# Patient Record
Sex: FEMALE | Race: WHITE | NOT HISPANIC OR LATINO | URBAN - METROPOLITAN AREA
[De-identification: names, ages, dates, MRNs, and addresses within clinical notes are randomized per-mention and may not be internally consistent; named-entity substitution may affect disease eponyms.]

---

## 2022-01-24 ENCOUNTER — OFFICE VISIT (OUTPATIENT)
Dept: URBAN - METROPOLITAN AREA CLINIC 118 | Facility: CLINIC | Age: 57
End: 2022-01-24

## 2022-03-16 ENCOUNTER — LAB OUTSIDE AN ENCOUNTER (OUTPATIENT)
Dept: URBAN - METROPOLITAN AREA CLINIC 118 | Facility: CLINIC | Age: 57
End: 2022-03-16

## 2022-03-16 ENCOUNTER — OFFICE VISIT (OUTPATIENT)
Dept: URBAN - METROPOLITAN AREA CLINIC 118 | Facility: CLINIC | Age: 57
End: 2022-03-16
Payer: COMMERCIAL

## 2022-03-16 VITALS
WEIGHT: 271 LBS | BODY MASS INDEX: 43.55 KG/M2 | DIASTOLIC BLOOD PRESSURE: 80 MMHG | HEART RATE: 60 BPM | SYSTOLIC BLOOD PRESSURE: 149 MMHG | TEMPERATURE: 97.7 F | HEIGHT: 66 IN

## 2022-03-16 DIAGNOSIS — E66.01 MORBID OBESITY DUE TO EXCESS CALORIES: ICD-10-CM

## 2022-03-16 DIAGNOSIS — R76.8 HEPATITIS B SURFACE ANTIGEN POSITIVE: ICD-10-CM

## 2022-03-16 DIAGNOSIS — Z12.11 SCREEN FOR COLON CANCER: ICD-10-CM

## 2022-03-16 PROBLEM — 238136002: Status: ACTIVE | Noted: 2022-03-16

## 2022-03-16 PROCEDURE — 99204 OFFICE O/P NEW MOD 45 MIN: CPT | Performed by: INTERNAL MEDICINE

## 2022-03-16 RX ORDER — DULOXETINE 30 MG/1
1 CAPSULE CAPSULE, DELAYED RELEASE PELLETS ORAL ONCE A DAY
Status: ACTIVE | COMMUNITY

## 2022-03-16 RX ORDER — AMLODIPINE BESYLATE 5 MG/1
1 TABLET TABLET ORAL ONCE A DAY
Status: ACTIVE | COMMUNITY

## 2022-03-16 RX ORDER — DICLOFENAC POTASSIUM 50 MG/1
1 TABLET WITH FOOD OR MILK AS NEEDED TABLET, FILM COATED ORAL TWICE A DAY
Status: ACTIVE | COMMUNITY

## 2022-03-16 NOTE — HPI-TODAY'S VISIT:
recently diagnosed with HbsAg pos 9/2021 and repeat pos 10/2021 reviewed outside labs, including AST/ALT normal HCV Ab neg HBV surf ag pos x 2  would donate plasma x 3, after 3rrd time told hbv pos  but was rejected 9/2021 as tested hbv pos no risk factors during that time frame +non professional tattoo a couple years ago  reports all household contacts were tested and neg

## 2022-03-18 LAB
A/G RATIO: 1.7
ALBUMIN: 4.2
ALKALINE PHOSPHATASE: 84
ALT (SGPT): 11
AST (SGOT): 15
BASO (ABSOLUTE): 0
BASOS: 0
BILIRUBIN, TOTAL: 0.3
BUN/CREATININE RATIO: 14
BUN: 12
CALCIUM: 9
CARBON DIOXIDE, TOTAL: 24
CHLORIDE: 101
CREATININE: 0.87
EGFR: 79
EOS (ABSOLUTE): 0.1
EOS: 1
GLOBULIN, TOTAL: 2.5
GLUCOSE: 88
HBSAG SCREEN: POSITIVE
HBV IU/ML: 300
HEMATOCRIT: 42.1
HEMATOLOGY COMMENTS:: (no result)
HEMOGLOBIN: 13.4
HEP A AB, TOTAL: NEGATIVE
HEP BE AB: POSITIVE
HEP BE AG: NEGATIVE
HEPATITIS B SURF AB QUANT: <3.1
HIV SCREEN 4TH GENERATION WRFX: NON REACTIVE
IMMATURE CELLS: (no result)
IMMATURE GRANS (ABS): 0
IMMATURE GRANULOCYTES: 0
INR: 0.9
LOG10 HBV IU/ML: 2.48
LYMPHS (ABSOLUTE): 2.1
LYMPHS: 31
Lab: (no result)
MCH: 26.4
MCHC: 31.8
MCV: 83
MONOCYTES(ABSOLUTE): 0.6
MONOCYTES: 9
NEUTROPHILS (ABSOLUTE): 3.8
NEUTROPHILS: 59
NRBC: (no result)
PLATELETS: 273
POTASSIUM: 4.4
PROTEIN, TOTAL: 6.7
PROTHROMBIN TIME: 10
RBC: 5.07
RDW: 14.3
SODIUM: 142
TEST INFORMATION:: (no result)
WBC: 6.7

## 2022-04-01 ENCOUNTER — TELEPHONE ENCOUNTER (OUTPATIENT)
Dept: URBAN - METROPOLITAN AREA CLINIC 23 | Facility: CLINIC | Age: 57
End: 2022-04-01

## 2022-08-31 ENCOUNTER — OFFICE VISIT (OUTPATIENT)
Dept: URBAN - METROPOLITAN AREA CLINIC 118 | Facility: CLINIC | Age: 57
End: 2022-08-31

## 2024-01-03 ENCOUNTER — OFFICE VISIT (OUTPATIENT)
Dept: URBAN - METROPOLITAN AREA CLINIC 118 | Facility: CLINIC | Age: 59
End: 2024-01-03
Payer: COMMERCIAL

## 2024-01-03 ENCOUNTER — DASHBOARD ENCOUNTERS (OUTPATIENT)
Age: 59
End: 2024-01-03

## 2024-01-03 VITALS
BODY MASS INDEX: 42.14 KG/M2 | HEIGHT: 66 IN | SYSTOLIC BLOOD PRESSURE: 133 MMHG | HEART RATE: 62 BPM | WEIGHT: 262.2 LBS | DIASTOLIC BLOOD PRESSURE: 78 MMHG | TEMPERATURE: 97.3 F

## 2024-01-03 DIAGNOSIS — E66.01 MORBID OBESITY DUE TO EXCESS CALORIES: ICD-10-CM

## 2024-01-03 DIAGNOSIS — R76.8 HEPATITIS B SURFACE ANTIGEN POSITIVE: ICD-10-CM

## 2024-01-03 DIAGNOSIS — Z12.11 SCREEN FOR COLON CANCER: ICD-10-CM

## 2024-01-03 PROCEDURE — 99214 OFFICE O/P EST MOD 30 MIN: CPT | Performed by: INTERNAL MEDICINE

## 2024-01-03 RX ORDER — DESVENLAFAXINE 50 MG/1
TABLET, FILM COATED, EXTENDED RELEASE ORAL
Qty: 90 TABLET | Status: ACTIVE | COMMUNITY

## 2024-01-03 RX ORDER — AMLODIPINE BESYLATE 5 MG/1
1 TABLET TABLET ORAL ONCE A DAY
Status: ACTIVE | COMMUNITY

## 2024-01-03 RX ORDER — LISINOPRIL 10 MG/1
TABLET ORAL
Qty: 30 TABLET | Status: ACTIVE | COMMUNITY

## 2024-01-03 RX ORDER — GABAPENTIN 100 MG/1
TAKE 1 CAPSULE BY MOUTH ONCE DAILY FOR 90 DAYS CAPSULE ORAL
Qty: 90 EACH | Refills: 0 | Status: ACTIVE | COMMUNITY

## 2024-01-03 RX ORDER — DULOXETINE 30 MG/1
1 CAPSULE CAPSULE, DELAYED RELEASE PELLETS ORAL ONCE A DAY
Status: ACTIVE | COMMUNITY

## 2024-01-03 RX ORDER — DICLOFENAC POTASSIUM 50 MG/1
1 TABLET WITH FOOD OR MILK AS NEEDED TABLET, FILM COATED ORAL TWICE A DAY
Status: ACTIVE | COMMUNITY

## 2024-01-03 RX ORDER — DICYCLOMINE HYDROCHLORIDE 10 MG/1
1 TABLET CAPSULE ORAL THREE TIMES A DAY
Qty: 90 TABLET | Refills: 1 | OUTPATIENT
Start: 2024-01-03 | End: 2024-03-03

## 2024-01-03 NOTE — HPI-TODAY'S VISIT:
Last seen 3/2022 labs as below  she reports was having diarrhea and constipation alternating does have some good days as well  also reports referred by Dr. Cobos prior to surgery for prolapsing  3/2022  recently diagnosed with HbsAg pos 9/2021 and repeat pos 10/2021 reviewed outside labs, including AST/ALT normal HCV Ab neg HBV surf ag pos x 2  would donate plasma x 3, after 3rrd time told hbv pos  but was rejected 9/2021 as tested hbv pos no risk factors during that time frame +non professional tattoo a couple years ago  reports all household contacts were tested and neg

## 2024-01-05 LAB
A/G RATIO: 1.4
ALBUMIN: 4.2
ALKALINE PHOSPHATASE: 78
ALT (SGPT): 10
AST (SGOT): 12
BILIRUBIN, TOTAL: 0.3
BUN/CREATININE RATIO: (no result)
BUN: 17
CALCIUM: 9.4
CARBON DIOXIDE, TOTAL: 31
CHLORIDE: 101
CREATININE: 1.01
EGFR: 65
GLOBULIN, TOTAL: 2.9
GLUCOSE: 102
HBV LOG10: 1.76
HEPATITIS B VIRUS DNA: 58
POTASSIUM: 4.2
PROTEIN, TOTAL: 7.1
SODIUM: 141

## 2024-01-25 ENCOUNTER — CLAIMS CREATED FROM THE CLAIM WINDOW (OUTPATIENT)
Dept: URBAN - METROPOLITAN AREA CLINIC 4 | Facility: CLINIC | Age: 59
End: 2024-01-25
Payer: COMMERCIAL

## 2024-01-25 ENCOUNTER — OFFICE VISIT (OUTPATIENT)
Dept: URBAN - METROPOLITAN AREA SURGERY CENTER 23 | Facility: SURGERY CENTER | Age: 59
End: 2024-01-25
Payer: COMMERCIAL

## 2024-01-25 DIAGNOSIS — Z12.11 COLON CANCER SCREENING: ICD-10-CM

## 2024-01-25 DIAGNOSIS — K62.1 ANAL AND RECTAL POLYP: ICD-10-CM

## 2024-01-25 DIAGNOSIS — K57.30 DIVERTICULA OF COLON: ICD-10-CM

## 2024-01-25 DIAGNOSIS — K63.89 OTHER SPECIFIED DISEASES OF INTESTINE: ICD-10-CM

## 2024-01-25 DIAGNOSIS — K62.1 POLYP OF RECTUM: ICD-10-CM

## 2024-01-25 PROCEDURE — 45385 COLONOSCOPY W/LESION REMOVAL: CPT | Performed by: INTERNAL MEDICINE

## 2024-01-25 PROCEDURE — 88305 TISSUE EXAM BY PATHOLOGIST: CPT | Performed by: PATHOLOGY

## 2024-01-25 PROCEDURE — 00811 ANES LWR INTST NDSC NOS: CPT | Performed by: NURSE ANESTHETIST, CERTIFIED REGISTERED

## 2024-01-25 PROCEDURE — G8907 PT DOC NO EVENTS ON DISCHARG: HCPCS | Performed by: INTERNAL MEDICINE

## 2024-01-25 RX ORDER — LISINOPRIL 10 MG/1
TABLET ORAL
Qty: 30 TABLET | Status: ACTIVE | COMMUNITY

## 2024-01-25 RX ORDER — AMLODIPINE BESYLATE 5 MG/1
1 TABLET TABLET ORAL ONCE A DAY
Status: ACTIVE | COMMUNITY

## 2024-01-25 RX ORDER — GABAPENTIN 100 MG/1
TAKE 1 CAPSULE BY MOUTH ONCE DAILY FOR 90 DAYS CAPSULE ORAL
Qty: 90 EACH | Refills: 0 | Status: ACTIVE | COMMUNITY

## 2024-01-25 RX ORDER — DICLOFENAC POTASSIUM 50 MG/1
1 TABLET WITH FOOD OR MILK AS NEEDED TABLET, FILM COATED ORAL TWICE A DAY
Status: ACTIVE | COMMUNITY

## 2024-01-25 RX ORDER — DICYCLOMINE HYDROCHLORIDE 10 MG/1
1 TABLET CAPSULE ORAL THREE TIMES A DAY
Qty: 90 TABLET | Refills: 1 | Status: ACTIVE | COMMUNITY
Start: 2024-01-03 | End: 2024-03-03

## 2024-01-25 RX ORDER — DULOXETINE 30 MG/1
1 CAPSULE CAPSULE, DELAYED RELEASE PELLETS ORAL ONCE A DAY
Status: ACTIVE | COMMUNITY

## 2024-01-25 RX ORDER — DESVENLAFAXINE 50 MG/1
TABLET, FILM COATED, EXTENDED RELEASE ORAL
Qty: 90 TABLET | Status: ACTIVE | COMMUNITY

## 2024-07-01 ENCOUNTER — LAB OUTSIDE AN ENCOUNTER (OUTPATIENT)
Dept: URBAN - METROPOLITAN AREA CLINIC 118 | Facility: CLINIC | Age: 59
End: 2024-07-01